# Patient Record
Sex: MALE | Race: WHITE | NOT HISPANIC OR LATINO | URBAN - METROPOLITAN AREA
[De-identification: names, ages, dates, MRNs, and addresses within clinical notes are randomized per-mention and may not be internally consistent; named-entity substitution may affect disease eponyms.]

---

## 2017-01-31 NOTE — PATIENT DISCUSSION
PCF, OU - VISUALLY SIGNIFICANT. SCHEDULE OS EYE FIRST THEN LATER IN THE OD EYE IF VISUAL SYMPTOMS PERSIST.

## 2017-01-31 NOTE — PATIENT DISCUSSION
Posterior Capsular Fibrosis Counseling: The diagnosis of posterior capsular fibrosis (PCF), also referred to as a secondary cataract or posterior capsular opacification (PCO), was discussed with the patient. The patient understands that their symptoms and limitations are likely related to this condition. I have reviewed the risks, benefits and alternatives of  YAG laser surgery for the treatment of the fibrosis. The uncommon risk of an increase in intraocular pressure or a retinal detachment and their associated symptoms were explained to the patient. The patient understands and desires to proceed with the laser surgery to improve their vision.

## 2017-03-07 NOTE — PATIENT DISCUSSION
s/p YAG OS - MAC OCT today secondary to decreased vision. MAC OU looks excellent, anatomy appears WNL.  Begin increasing Refresh Celluvisc 2-3 times a day and repeat AR/Mrx OU next visit to give updated specs and hold on YAG OD.

## 2017-04-07 NOTE — PATIENT DISCUSSION
New Prescription: Refresh Celluvisc (carboxymethylcellulose sodium): dropperette,gel: 1% 1 drop three times a day into both eyes 04-

## 2017-07-14 NOTE — PATIENT DISCUSSION
Continue: Refresh Celluvisc (carboxymethylcellulose sodium): dropperette,gel: 1% 1 drop three times a day into both eyes 04-

## 2018-01-12 NOTE — PATIENT DISCUSSION
Continue: Refresh Liquigel (carboxymethylcellulose sodium): drops, liquid gel: 1% once a day into both eyes

## 2019-08-27 ENCOUNTER — IMPORTED ENCOUNTER (OUTPATIENT)
Dept: URBAN - METROPOLITAN AREA CLINIC 50 | Facility: CLINIC | Age: 59
End: 2019-08-27

## 2020-08-27 ENCOUNTER — IMPORTED ENCOUNTER (OUTPATIENT)
Dept: URBAN - METROPOLITAN AREA CLINIC 50 | Facility: CLINIC | Age: 60
End: 2020-08-27

## 2020-08-27 NOTE — PATIENT DISCUSSION
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 Mild diabetic retinopathy found. Bilateral macular edema is not present. Patient instructed to call office immediately if sudden changes in vision occur. Emphasized importance of good blood glucose control. Return in 1 year for dilated fundus exam. Summary of care provided to the physician managing the ongoing diabetes care. """

## 2021-04-17 ASSESSMENT — VISUAL ACUITY
OS_CC: J1+
OD_CC: J1+
OD_CC: J1+ (-)
OS_CC: J1+ (-)
OS_CC: 20/30
OS_CC: 20/25
OD_CC: 20/25
OD_CC: 20/25-

## 2021-04-17 ASSESSMENT — TONOMETRY
OD_IOP_MMHG: 16
OS_IOP_MMHG: 16
OD_IOP_MMHG: 14
OS_IOP_MMHG: 14

## 2021-08-09 ENCOUNTER — PREPPED CHART (OUTPATIENT)
Dept: URBAN - METROPOLITAN AREA CLINIC 48 | Facility: CLINIC | Age: 61
End: 2021-08-09

## 2021-08-09 ASSESSMENT — VISUAL ACUITY
OD_CC: J1+
OS_CC: J1+
OS_CC: 20/25
OD_CC: 20/25

## 2021-08-09 ASSESSMENT — TONOMETRY
OD_IOP_MMHG: 14
OS_IOP_MMHG: 14

## 2021-08-09 NOTE — PATIENT DISCUSSION
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 Mild diabetic retinopathy found. Bilateral macular edema is not present. Patient instructed to call office immediately if sudden changes in vision occur. Emphasized importance of good blood glucose control. Return in 1 year for dilated fundus exam. Summary of care provided to the physician managing the ongoing diabetes care. ""."

## 2021-08-26 ENCOUNTER — COMPREHENSIVE EXAM (OUTPATIENT)
Dept: URBAN - METROPOLITAN AREA CLINIC 48 | Facility: CLINIC | Age: 61
End: 2021-08-26

## 2021-08-26 DIAGNOSIS — H04.123: ICD-10-CM

## 2021-08-26 DIAGNOSIS — H52.4: ICD-10-CM

## 2021-08-26 DIAGNOSIS — H25.811: ICD-10-CM

## 2021-08-26 DIAGNOSIS — D31.32: ICD-10-CM

## 2021-08-26 DIAGNOSIS — H25.12: ICD-10-CM

## 2021-08-26 DIAGNOSIS — E10.3593: ICD-10-CM

## 2021-08-26 PROCEDURE — 92015 DETERMINE REFRACTIVE STATE: CPT

## 2021-08-26 PROCEDURE — 92014 COMPRE OPH EXAM EST PT 1/>: CPT

## 2021-08-26 PROCEDURE — 92134 CPTRZ OPH DX IMG PST SGM RTA: CPT

## 2021-08-26 ASSESSMENT — KERATOMETRY
OD_K2POWER_DIOPTERS: 43.75
OS_AXISANGLE2_DEGREES: 145
OD_AXISANGLE_DEGREES: 115
OS_AXISANGLE_DEGREES: 055
OD_K1POWER_DIOPTERS: 44.25
OS_K1POWER_DIOPTERS: 44.00
OS_K2POWER_DIOPTERS: 43.75
OD_AXISANGLE2_DEGREES: 25

## 2021-08-26 ASSESSMENT — VISUAL ACUITY
OD_CC: 20/25-2
OU_CC: J1+
OD_GLARE: 20/30
OU_CC: 20/25
OS_CC: 20/25-2
OS_GLARE: 20/30
OS_GLARE: 20/40
OD_GLARE: 20/40

## 2021-08-26 ASSESSMENT — TONOMETRY
OD_IOP_MMHG: 17
OS_IOP_MMHG: 17